# Patient Record
Sex: MALE | Race: OTHER | Employment: STUDENT | URBAN - METROPOLITAN AREA
[De-identification: names, ages, dates, MRNs, and addresses within clinical notes are randomized per-mention and may not be internally consistent; named-entity substitution may affect disease eponyms.]

---

## 2023-03-20 ENCOUNTER — OFFICE VISIT (OUTPATIENT)
Dept: ORTHOPEDIC SURGERY | Age: 23
End: 2023-03-20
Payer: COMMERCIAL

## 2023-03-20 VITALS — WEIGHT: 170 LBS | HEIGHT: 68 IN | BODY MASS INDEX: 25.76 KG/M2

## 2023-03-20 DIAGNOSIS — M25.312 SHOULDER INSTABILITY, LEFT: ICD-10-CM

## 2023-03-20 DIAGNOSIS — G89.29 CHRONIC LEFT SHOULDER PAIN: Primary | ICD-10-CM

## 2023-03-20 DIAGNOSIS — M25.512 CHRONIC LEFT SHOULDER PAIN: Primary | ICD-10-CM

## 2023-03-20 DIAGNOSIS — S43.432A LABRAL TEAR OF SHOULDER, LEFT, INITIAL ENCOUNTER: ICD-10-CM

## 2023-03-20 DIAGNOSIS — M21.822 HILL-SACHS LESION OF LEFT SHOULDER: ICD-10-CM

## 2023-03-20 PROCEDURE — 99204 OFFICE O/P NEW MOD 45 MIN: CPT | Performed by: ORTHOPAEDIC SURGERY

## 2023-03-20 NOTE — PROGRESS NOTES
Chidi Wagner (: 2000) is a [address] y.o. 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 He states that he dislocated 3 years ago and has had approximately 4-5 redislocation since that time. The patient did have an MRI performed at an outside facility that was independently reviewed today. Left shoulder MRI images and results were independently reviewed from an outside facility and they were consistent with a 270 degree labral tear including the superior portion of the labrum and also may have a small bony Bankart lesion. There is a minimal Hill-Sachs lesion. No Known Allergies    No current outpatient medications on file. No current facility-administered medications for this visit. History reviewed. No pertinent past medical history. History reviewed. No pertinent surgical history. History reviewed. No pertinent family history.      Social History     Socioeconomic History    Marital status: SINGLE     Spouse name: Not on file    Number of children: Not on file    Years of education: Not on file    Highest education level: Not on file   Occupational History    Not on file   Tobacco Use    Smoking status: Never    Smokeless tobacco: Never   Substance and Sexual Activity    Alcohol use: Not on file    Drug use: Not on file    Sexual activity: Not on file   Other Topics Concern    Not on file   Social History Narrative    Not on file     Social Determinants of Health     Financial Resource Strain: Not on file   Food Insecurity: Not on file   Transportation Needs: Not on file   Physical Activity: Not on file   Stress: Not on file   Social Connections: Not on file   Intimate Partner Violence: Not on file   Housing Stability: Not on file       Review of Systems   All other systems reviewed and are negative. Vitals:  Ht 5' 8\" (1.727 m)   Wt 170 lb (77.1 kg)   BMI 25.85 kg/m²    Body mass index is 25.85 kg/m². Ortho Exam     The patient is well-developed and well-nourished. The patient presents today in alert and oriented x3 with a normal mood and affect. The patient stands with a normal weightbearing line and walks with a normal gait. Left shoulder has full forward flexion and abduction but he does have significant apprehension with external rotation. His strength well-maintained. His sensations intact his pulses are 2+. His skin is normal.  Stability testing showed anterior instability. ASSESSMENT/PLAN:      1. Chronic left shoulder pain  2. Shoulder instability, left  -     REFERRAL TO ORTHOPEDIC SURGERY  3. Labral tear of shoulder, left, initial encounter  -     REFERRAL TO ORTHOPEDIC SURGERY  4. Hill-Sachs lesion of left shoulder  -     REFERRAL TO ORTHOPEDIC SURGERY     Below is the assessment and plan developed based on review of pertinent history, physical exam, labs, studies, and medications. We discussed the patient's ongoing left shoulder pain. His signs, symptoms, physical exam, and description of his pain are consistent with an initial shoulder dislocation that has been followed by 4-5 recurrent shoulder dislocations since then. He has been having increased discomfort since his first dislocation approximately 3 years ago. We did independently review his MRI images and results and they were consistent with a 270 degree labral tear and a small Hill-Sachs lesion.   The possible treatment options were discussed with the patient and because of the over 3-year long duration of his increased pain, no improvement with multiple modalities of conservative management including an at-home exercise program, his physical exam and recurrent dislocations, independently reviewed outside MRI images and results, and his inability to complete daily living activities without significant discomfort we both decided that surgical intervention was the best treatment plan. The risks and benefits of a left shoulder arthroscopic exam with shoulder stabilization and SLAP repair were discussed in detail with the patient and he would like to proceed. We will schedule this at his convenience. In the interim, I did encourage him to ice when possible, modify his activity level based on his left shoulder pain, and use anti-inflammatory medication when necessary. He will work on range of motion, strengthening, and stretching exercises with an at-home exercise program as pain tolerates. He is to avoid any provocative activities and try to avoid any additional shoulder dislocations. I will see him back in the office on an as-needed basis or on the day of his upcoming left shoulder surgery. Return At the time of his surgical intervention. .    An electronic signature was used to authenticate this note.   -- Vick Weathers MD

## 2023-04-17 ENCOUNTER — OFFICE VISIT (OUTPATIENT)
Dept: ORTHOPEDIC SURGERY | Age: 23
End: 2023-04-17
Payer: COMMERCIAL

## 2023-04-17 VITALS — BODY MASS INDEX: 25.76 KG/M2 | HEIGHT: 68 IN | WEIGHT: 170 LBS

## 2023-04-17 DIAGNOSIS — M25.512 CHRONIC LEFT SHOULDER PAIN: Primary | ICD-10-CM

## 2023-04-17 DIAGNOSIS — M21.822 HILL-SACHS LESION OF LEFT SHOULDER: ICD-10-CM

## 2023-04-17 DIAGNOSIS — M25.312 SHOULDER INSTABILITY, LEFT: ICD-10-CM

## 2023-04-17 DIAGNOSIS — S43.432D LABRAL TEAR OF SHOULDER, LEFT, SUBSEQUENT ENCOUNTER: ICD-10-CM

## 2023-04-17 DIAGNOSIS — G89.29 CHRONIC LEFT SHOULDER PAIN: Primary | ICD-10-CM

## 2023-04-17 PROCEDURE — 99214 OFFICE O/P EST MOD 30 MIN: CPT | Performed by: ORTHOPAEDIC SURGERY

## 2023-04-17 NOTE — PROGRESS NOTES
Stevie Serna (: 2000) is a 23 y.o. male, patient, here for evaluation of the following chief complaint(s):  Shoulder Pain (Left shoulder, discuss sx)       HPI:    He was last seen for his left shoulder pain on 3/20/2023. The patient states that his pain level is the same as it was at his last visit. He gives no detail or description of his discomfort. His left shoulder pain does occasionally wake him up from sleep at night. He reports taking no medication for his discomfort. He did have an MRI performed on his left shoulder prior to his last visit which was independently reviewed then and again today. Left shoulder MRI images and results was again independently reviewed from an outside facility and they were consistent with a 270 degree labral tear including the superior portion of the labrum and also may have a small bony Bankart lesion. There is a minimal Hill-Sachs lesion. No Known Allergies    No current outpatient medications on file. No current facility-administered medications for this visit. History reviewed. No pertinent past medical history. History reviewed. No pertinent surgical history. History reviewed. No pertinent family history.      Social History     Socioeconomic History    Marital status: SINGLE     Spouse name: Not on file    Number of children: Not on file    Years of education: Not on file    Highest education level: Not on file   Occupational History    Not on file   Tobacco Use    Smoking status: Never    Smokeless tobacco: Never   Substance and Sexual Activity    Alcohol use: Not on file    Drug use: Not on file    Sexual activity: Not on file   Other Topics Concern    Not on file   Social History Narrative    Not on file     Social Determinants of Health     Financial Resource Strain: Not on file   Food Insecurity: Not on file   Transportation Needs: Not on file   Physical Activity: Not on file   Stress: Not on file   Social Connections: Not on file   Intimate Partner Violence: Not on file   Housing Stability: Not on file       Review of Systems   All other systems reviewed and are negative. Vitals:  Ht 5' 8\" (1.727 m)   Wt 170 lb (77.1 kg)   BMI 25.85 kg/m²    Body mass index is 25.85 kg/m². Ortho Exam     The patient is well-developed and well-nourished. The patient presents today in alert and oriented x3 with a normal mood and affect. The patient stands with a normal weightbearing line and walks with a normal gait. Left shoulder has full forward flexion and abduction but he does have significant apprehension with external rotation. His strength well-maintained. His sensations intact his pulses are 2+. His skin is normal.  Stability testing showed anterior instability. ASSESSMENT/PLAN:      1. Chronic left shoulder pain  2. Shoulder instability, left  3. Hill-Sachs lesion of left shoulder  4. Labral tear of shoulder, left, subsequent encounter       Below is the assessment and plan developed based on review of pertinent history, physical exam, labs, studies, and medications. We discussed the patient's ongoing left shoulder pain. His signs, symptoms, physical exam, and description of his pain are consistent with an initial shoulder dislocation that has been followed by 4-5 recurrent shoulder dislocations since then. He has been having increased discomfort since his first dislocation approximately 3 years ago. We did independently review his MRI images and results and they were consistent with a 270 degree labral tear and a small Hill-Sachs lesion.   The possible treatment options were again discussed with the patient and because of the over 3-year long duration of his increased pain, no improvement with multiple modalities of conservative management including an at-home exercise program, his physical exam and recurrent dislocations, independently reviewed outside MRI images and results, and his inability to complete daily living activities without significant discomfort we both decided that surgical intervention was the best treatment plan. The risks and benefits of a left shoulder arthroscopic exam with shoulder stabilization and open biceps tenodesis versus SLAP lesion repair were discussed in detail with the patient and he would like to proceed. We will schedule this at his convenience. In the interim, I did encourage him to ice when possible, modify his activity level based on his left shoulder pain, and use anti-inflammatory medication when necessary. He will work on range of motion, strengthening, and stretching exercises with an at-home exercise program as pain tolerates. He is to avoid any provocative activities and try to avoid any additional shoulder dislocations. I will see him back in the office on an as-needed basis or on the day of his upcoming left shoulder surgery. Return for In the office as needed or on the day of his upcoming left shoulder surgery. An electronic signature was used to authenticate this note.   -- Xena Andrade MD